# Patient Record
Sex: FEMALE | Race: WHITE | URBAN - METROPOLITAN AREA
[De-identification: names, ages, dates, MRNs, and addresses within clinical notes are randomized per-mention and may not be internally consistent; named-entity substitution may affect disease eponyms.]

---

## 2022-06-25 ENCOUNTER — OFFICE VISIT (OUTPATIENT)
Dept: URBAN - METROPOLITAN AREA CLINIC 24 | Facility: CLINIC | Age: 72
End: 2022-06-25
Payer: MEDICARE

## 2022-06-25 DIAGNOSIS — Z41.1 ENCOUNTER FOR COSMETIC SURGERY: ICD-10-CM

## 2022-06-25 DIAGNOSIS — H02.423 MYOGENIC PTOSIS OF BILATERAL EYELIDS: Primary | ICD-10-CM

## 2022-06-25 PROCEDURE — 92081 LIMITED VISUAL FIELD XM: CPT | Performed by: OPHTHALMOLOGY

## 2022-06-25 PROCEDURE — 92285 EXTERNAL OCULAR PHOTOGRAPHY: CPT | Performed by: OPHTHALMOLOGY

## 2022-06-25 PROCEDURE — 99204 OFFICE O/P NEW MOD 45 MIN: CPT | Performed by: OPHTHALMOLOGY

## 2022-06-25 RX ORDER — NEOMYCIN SULFATE, POLYMYXIN B SULFATE AND DEXAMETHASONE 3.5; 10000; 1 MG/G; [USP'U]/G; MG/G
OINTMENT OPHTHALMIC
Qty: 2 | Refills: 2 | Status: ACTIVE
Start: 2022-06-25

## 2022-06-25 NOTE — IMPRESSION/PLAN
Impression: Encounter for cosmetic surgery: Z41.1. Plan: Discussed possible cosmetic  the future in the upper lids.

## 2022-06-25 NOTE — IMPRESSION/PLAN
Impression: Myogenic ptosis of bilateral eyelids: H02.423. Plan: Albert screen VF & photos were performed and interpreted today and demonstrated restriction of superior and temporal visual fields. This reverted to normal demonstrating >30% increase in visual field with mechanical elevation of the eyelid and brow. The degree of ptosis will require an external levator resection to correct the eyelid ptosis. RBA were discussed with the patient and all questions were answered. Visual field shows restriction of superior and temporal visual fields in resting position. With eyelids/brows elevated/taped there is a significant (>30%) improvement in the superior and temporal visual fields. Skin and medial central upper lid fat transposition.

## 2022-06-25 NOTE — IMPRESSION/PLAN
Impression: Encounter for cosmetic surgery: Z41.1. Plan: The patient inquired about improving the appearance of bags and puffiness in the lower eyelids. We discussed cosmetic lower lid blepharoplasty w/ fat removal and repositioning. I explained the dual nature of lower lid bags, specifically the fact that I can improve the fat component via surgery, however the physiologic fluid fluctuation is not amenable to surgery, and will continue. Discussed the R/B/A of this procedure, all questions were answered.

## 2022-10-06 ENCOUNTER — ADULT PHYSICAL (OUTPATIENT)
Dept: URBAN - METROPOLITAN AREA CLINIC 24 | Facility: CLINIC | Age: 72
End: 2022-10-06
Payer: MEDICARE

## 2022-10-06 DIAGNOSIS — Z01.818 ENCOUNTER FOR OTHER PREPROCEDURAL EXAMINATION: Primary | ICD-10-CM

## 2022-10-06 DIAGNOSIS — H02.423 MYOGENIC PTOSIS OF BILATERAL EYELIDS: ICD-10-CM

## 2022-10-06 PROCEDURE — 99202 OFFICE O/P NEW SF 15 MIN: CPT | Performed by: PHYSICIAN ASSISTANT

## 2022-10-26 ENCOUNTER — POST-OPERATIVE VISIT (OUTPATIENT)
Dept: URBAN - METROPOLITAN AREA CLINIC 10 | Facility: CLINIC | Age: 72
End: 2022-10-26
Payer: MEDICARE

## 2022-10-26 DIAGNOSIS — Z48.89 ENCOUNTER FOR OTHER SPECIFIED SURGICAL AFTERCARE: Primary | ICD-10-CM

## 2022-10-26 PROCEDURE — 99024 POSTOP FOLLOW-UP VISIT: CPT | Performed by: OPHTHALMOLOGY

## 2022-10-26 NOTE — IMPRESSION/PLAN
Impression:  Encounter for other specified surgical aftercare  Z48.89. Plan: s/p BELR. healing well. RUL 0.5-1mm higher than STEVEN. still with post-edema. post-op instructions reviewed. RTC 3-4 weeks or sooner PRN.

## 2022-11-29 ENCOUNTER — POST-OPERATIVE VISIT (OUTPATIENT)
Dept: URBAN - METROPOLITAN AREA CLINIC 30 | Facility: CLINIC | Age: 72
End: 2022-11-29
Payer: MEDICARE

## 2022-11-29 DIAGNOSIS — Z48.89 ENCOUNTER FOR OTHER SPECIFIED SURGICAL AFTERCARE: Primary | ICD-10-CM

## 2022-11-29 PROCEDURE — 99024 POSTOP FOLLOW-UP VISIT: CPT | Performed by: OPHTHALMOLOGY

## 2022-11-29 NOTE — IMPRESSION/PLAN
Impression: S/P External Levator Resection - Both Upper Lids OU - 46 Days. Encounter for other specified surgical aftercare  Z48.89. Plan: mild RUL peak/retraction compared to left. no exposure or lag. start downward massage of RUL to help with height/peak; explained that we can consider RUL retraction repair in future if no improvement with massage. RTC 6 weeks or sooner PRN.

## 2023-01-12 ENCOUNTER — POST-OPERATIVE VISIT (OUTPATIENT)
Dept: URBAN - METROPOLITAN AREA CLINIC 24 | Facility: CLINIC | Age: 73
End: 2023-01-12
Payer: MEDICARE

## 2023-01-12 DIAGNOSIS — Z48.89 ENCOUNTER FOR OTHER SPECIFIED SURGICAL AFTERCARE: Primary | ICD-10-CM

## 2023-01-12 PROCEDURE — 99024 POSTOP FOLLOW-UP VISIT: CPT | Performed by: OPHTHALMOLOGY

## 2023-01-12 NOTE — IMPRESSION/PLAN
Impression:  Encounter for other specified surgical aftercare  Z48.89. Plan: mild relative RUL Retraction but improved from previous appt. Instructed patient to continue RUL downward massage. RTC 3-6 months if necessary.

## 2023-04-06 ENCOUNTER — OFFICE VISIT (OUTPATIENT)
Dept: URBAN - METROPOLITAN AREA CLINIC 24 | Facility: CLINIC | Age: 73
End: 2023-04-06
Payer: MEDICARE

## 2023-04-06 DIAGNOSIS — Z48.89 ENCOUNTER FOR OTHER SPECIFIED SURGICAL AFTERCARE: ICD-10-CM

## 2023-04-06 DIAGNOSIS — H02.88B MEIBOMIAN GLAND DYSFUNCTION LEFT EYE, UPPER AND LOWER EYELIDS: ICD-10-CM

## 2023-04-06 DIAGNOSIS — H40.013 OPEN ANGLE WITH BORDERLINE FINDINGS, LOW RISK, BILATERAL: ICD-10-CM

## 2023-04-06 DIAGNOSIS — L03.213 PRESEPTAL CELLULITIS: Primary | ICD-10-CM

## 2023-04-06 DIAGNOSIS — H00.024 HORDEOLUM INTERNUM LEFT UPPER EYELID: ICD-10-CM

## 2023-04-06 DIAGNOSIS — H02.88A MEIBOMIAN GLAND DYSFUNCTION RIGHT EYE, UPPER AND LOWER EYELIDS: ICD-10-CM

## 2023-04-06 PROCEDURE — 99203 OFFICE O/P NEW LOW 30 MIN: CPT | Performed by: STUDENT IN AN ORGANIZED HEALTH CARE EDUCATION/TRAINING PROGRAM

## 2023-04-06 RX ORDER — EYELID CLEANSER COMBINATION 12
PADS, MEDICATED (EA) TOPICAL
Qty: 225 | Refills: 11 | Status: ACTIVE
Start: 2023-04-06

## 2023-04-06 RX ORDER — AMOXICILLIN AND CLAVULANATE POTASSIUM 875; 125 MG/1; MG/1
TABLET, FILM COATED ORAL
Qty: 14 | Refills: 0 | Status: ACTIVE
Start: 2023-04-06

## 2023-04-06 ASSESSMENT — INTRAOCULAR PRESSURE
OS: 12
OD: 12

## 2023-04-06 NOTE — IMPRESSION/PLAN
Impression: Open angle with borderline findings, low risk, bilateral: H40.013. Plan: 2' to c/d on small pupil eval
IOP good / low @12 OU Return as scheduled for CE with RNFL / Doree Lank Expected Date Of Service: 09/30/2020

## 2023-04-06 NOTE — IMPRESSION/PLAN
Impression: Encounter for other specified surgical aftercare  Z48.89. Plan: Mild relative RUL Retraction but may be confounded by slight ptosis 2' to hordeolum and preseptal cellulitis OS.  Cont gentle lid massage

## 2023-04-06 NOTE — IMPRESSION/PLAN
Impression: Meibomian gland dysfunction left eye, upper and lower eyelids: H02.88B.  Plan: Recommend WC daily and AT prn

## 2023-04-06 NOTE — IMPRESSION/PLAN
Impression: Preseptal cellulitis: M64.137. Plan: 2' to internal hordeolum on STEVEN Start augmentin BID po x 1 wk then stop Switch to rice/sock method or OTC mask for WC 3x/day x 10min at a time RTC if NI in 7-10 days

## 2023-04-06 NOTE — IMPRESSION/PLAN
Impression: Meibomian gland dysfunction left eye, upper and lower eyelids: H02.88B.  Plan: See other MGD plan

## 2023-04-06 NOTE — IMPRESSION/PLAN
Impression: Hordeolum internum left upper eyelid: H00.024. Plan: Cause of concern of lesion on STEVEN. 
See #1 for details

## 2023-05-02 ENCOUNTER — OFFICE VISIT (OUTPATIENT)
Dept: URBAN - METROPOLITAN AREA CLINIC 24 | Facility: CLINIC | Age: 73
End: 2023-05-02
Payer: MEDICARE

## 2023-05-02 DIAGNOSIS — H25.813 COMBINED FORMS OF AGE-RELATED CATARACT, BILATERAL: ICD-10-CM

## 2023-05-02 DIAGNOSIS — H52.03 HYPERMETROPIA, BILATERAL: ICD-10-CM

## 2023-05-02 DIAGNOSIS — H40.013 OPEN ANGLE WITH BORDERLINE FINDINGS, LOW RISK, BILATERAL: Primary | ICD-10-CM

## 2023-05-02 DIAGNOSIS — H02.423 MYOGENIC PTOSIS OF BILATERAL EYELIDS: ICD-10-CM

## 2023-05-02 PROCEDURE — 99214 OFFICE O/P EST MOD 30 MIN: CPT | Performed by: OPTOMETRIST

## 2023-05-02 PROCEDURE — 76514 ECHO EXAM OF EYE THICKNESS: CPT | Performed by: OPTOMETRIST

## 2023-05-02 PROCEDURE — 92133 CPTRZD OPH DX IMG PST SGM ON: CPT | Performed by: OPTOMETRIST

## 2023-05-02 ASSESSMENT — INTRAOCULAR PRESSURE
OD: 12
OS: 11

## 2023-05-02 ASSESSMENT — VISUAL ACUITY
OD: 20/20
OS: 20/20

## 2023-05-02 NOTE — IMPRESSION/PLAN
Impression: Myogenic ptosis of bilateral eyelids: H02.423.
-- s/p ELR OU; Michael Gonzales MD - [id number] Plan: Good surgical outcome. No further action indicated.

## 2023-05-02 NOTE — IMPRESSION/PLAN
Impression: Open angle with borderline findings, low risk, bilateral: H40.013. 
-- known / longstanding history of physiologic cupping per discussion
--Tmax OD: , OS: 
--Gonio OD: , OS:
--Pachs OD: 512, OS: 596
--(-) fohx glc
--(-) sulfa allergy
--(-) pmhx lung disease or heart dz
--Target IOP: Plan: Baseline rnfl oct -- nml
IOP today: 12/11 Very low index of suspicion. Pt edu. Monitor yearly.

## 2023-12-29 ENCOUNTER — OFFICE VISIT (OUTPATIENT)
Dept: URBAN - METROPOLITAN AREA CLINIC 24 | Facility: CLINIC | Age: 73
End: 2023-12-29
Payer: MEDICARE

## 2023-12-29 DIAGNOSIS — H00.021 HORDEOLUM INTERNUM RIGHT UPPER EYELID: Primary | ICD-10-CM

## 2023-12-29 PROCEDURE — 92012 INTRM OPH EXAM EST PATIENT: CPT | Performed by: STUDENT IN AN ORGANIZED HEALTH CARE EDUCATION/TRAINING PROGRAM

## 2023-12-29 ASSESSMENT — INTRAOCULAR PRESSURE
OD: 17
OS: 18

## 2024-05-24 ENCOUNTER — OFFICE VISIT (OUTPATIENT)
Dept: URBAN - METROPOLITAN AREA CLINIC 24 | Facility: CLINIC | Age: 74
End: 2024-05-24
Payer: MEDICARE

## 2024-05-24 DIAGNOSIS — H16.143 PUNCTATE KERATITIS, BILATERAL: ICD-10-CM

## 2024-05-24 DIAGNOSIS — H40.013 OPEN ANGLE WITH BORDERLINE FINDINGS, LOW RISK, BILATERAL: Primary | ICD-10-CM

## 2024-05-24 DIAGNOSIS — H52.4 PRESBYOPIA: ICD-10-CM

## 2024-05-24 DIAGNOSIS — H25.813 COMBINED FORMS OF AGE-RELATED CATARACT, BILATERAL: ICD-10-CM

## 2024-05-24 PROCEDURE — 92133 CPTRZD OPH DX IMG PST SGM ON: CPT | Performed by: STUDENT IN AN ORGANIZED HEALTH CARE EDUCATION/TRAINING PROGRAM

## 2024-05-24 PROCEDURE — 99214 OFFICE O/P EST MOD 30 MIN: CPT | Performed by: STUDENT IN AN ORGANIZED HEALTH CARE EDUCATION/TRAINING PROGRAM

## 2024-05-24 ASSESSMENT — INTRAOCULAR PRESSURE
OS: 14
OD: 17

## 2024-05-24 ASSESSMENT — KERATOMETRY
OS: 42.73
OD: 42.58

## 2024-05-24 ASSESSMENT — VISUAL ACUITY
OS: 20/20
OD: 20/20

## 2025-04-17 ENCOUNTER — OFFICE VISIT (OUTPATIENT)
Dept: URBAN - METROPOLITAN AREA CLINIC 24 | Facility: CLINIC | Age: 75
End: 2025-04-17
Payer: MEDICARE

## 2025-04-17 DIAGNOSIS — H25.813 COMBINED FORMS OF AGE-RELATED CATARACT, BILATERAL: ICD-10-CM

## 2025-04-17 DIAGNOSIS — H40.013 OPEN ANGLE WITH BORDERLINE FINDINGS, LOW RISK, BILATERAL: Primary | ICD-10-CM

## 2025-04-17 DIAGNOSIS — H16.143 PUNCTATE KERATITIS, BILATERAL: ICD-10-CM

## 2025-04-17 DIAGNOSIS — H52.4 PRESBYOPIA: ICD-10-CM

## 2025-04-17 PROCEDURE — 92133 CPTRZD OPH DX IMG PST SGM ON: CPT | Performed by: STUDENT IN AN ORGANIZED HEALTH CARE EDUCATION/TRAINING PROGRAM

## 2025-04-17 PROCEDURE — 99214 OFFICE O/P EST MOD 30 MIN: CPT | Performed by: STUDENT IN AN ORGANIZED HEALTH CARE EDUCATION/TRAINING PROGRAM

## 2025-04-17 ASSESSMENT — VISUAL ACUITY
OS: 20/20
OD: 20/20

## 2025-04-17 ASSESSMENT — INTRAOCULAR PRESSURE
OD: 14
OS: 14

## 2025-04-17 ASSESSMENT — KERATOMETRY
OD: 42.52
OS: 42.90

## 2025-05-21 ENCOUNTER — OFFICE VISIT (OUTPATIENT)
Dept: URBAN - METROPOLITAN AREA CLINIC 24 | Facility: CLINIC | Age: 75
End: 2025-05-21
Payer: MEDICARE

## 2025-05-21 DIAGNOSIS — H52.4 PRESBYOPIA: Primary | ICD-10-CM

## 2025-05-21 PROCEDURE — 92015 DETERMINE REFRACTIVE STATE: CPT | Performed by: STUDENT IN AN ORGANIZED HEALTH CARE EDUCATION/TRAINING PROGRAM
